# Patient Record
Sex: FEMALE | Race: WHITE | NOT HISPANIC OR LATINO
[De-identification: names, ages, dates, MRNs, and addresses within clinical notes are randomized per-mention and may not be internally consistent; named-entity substitution may affect disease eponyms.]

---

## 2018-12-15 ENCOUNTER — TRANSCRIPTION ENCOUNTER (OUTPATIENT)
Age: 48
End: 2018-12-15

## 2019-04-30 ENCOUNTER — TRANSCRIPTION ENCOUNTER (OUTPATIENT)
Age: 49
End: 2019-04-30

## 2019-08-20 ENCOUNTER — TRANSCRIPTION ENCOUNTER (OUTPATIENT)
Age: 49
End: 2019-08-20

## 2021-01-15 ENCOUNTER — APPOINTMENT (OUTPATIENT)
Dept: PULMONOLOGY | Facility: CLINIC | Age: 51
End: 2021-01-15
Payer: COMMERCIAL

## 2021-01-15 DIAGNOSIS — Z20.822 CONTACT WITH AND (SUSPECTED) EXPOSURE TO COVID-19: ICD-10-CM

## 2021-01-15 DIAGNOSIS — R05 COUGH: ICD-10-CM

## 2021-01-15 DIAGNOSIS — U07.1 COVID-19: ICD-10-CM

## 2021-01-15 DIAGNOSIS — R07.89 OTHER CHEST PAIN: ICD-10-CM

## 2021-01-15 DIAGNOSIS — J45.909 UNSPECIFIED ASTHMA, UNCOMPLICATED: ICD-10-CM

## 2021-01-15 PROBLEM — Z00.00 ENCOUNTER FOR PREVENTIVE HEALTH EXAMINATION: Status: ACTIVE | Noted: 2021-01-15

## 2021-01-15 PROCEDURE — 99203 OFFICE O/P NEW LOW 30 MIN: CPT | Mod: 95

## 2021-01-16 ENCOUNTER — LABORATORY RESULT (OUTPATIENT)
Age: 51
End: 2021-01-16

## 2021-01-19 ENCOUNTER — NON-APPOINTMENT (OUTPATIENT)
Age: 51
End: 2021-01-19

## 2023-06-06 ENCOUNTER — APPOINTMENT (OUTPATIENT)
Dept: ORTHOPEDIC SURGERY | Facility: CLINIC | Age: 53
End: 2023-06-06

## 2023-06-06 ENCOUNTER — APPOINTMENT (OUTPATIENT)
Dept: ORTHOPEDIC SURGERY | Facility: CLINIC | Age: 53
End: 2023-06-06
Payer: COMMERCIAL

## 2023-06-06 VITALS — HEIGHT: 65 IN | WEIGHT: 154 LBS | BODY MASS INDEX: 25.66 KG/M2

## 2023-06-06 DIAGNOSIS — M54.12 RADICULOPATHY, CERVICAL REGION: ICD-10-CM

## 2023-06-06 DIAGNOSIS — M75.21 BICIPITAL TENDINITIS, RIGHT SHOULDER: ICD-10-CM

## 2023-06-06 DIAGNOSIS — M25.511 PAIN IN RIGHT SHOULDER: ICD-10-CM

## 2023-06-06 PROCEDURE — 73030 X-RAY EXAM OF SHOULDER: CPT | Mod: RT

## 2023-06-06 PROCEDURE — 72040 X-RAY EXAM NECK SPINE 2-3 VW: CPT

## 2023-06-06 PROCEDURE — 99203 OFFICE O/P NEW LOW 30 MIN: CPT

## 2023-06-06 RX ORDER — PSYLLIUM HUSK 0.4 G
1000-800 CAPSULE ORAL
Refills: 0 | Status: COMPLETED | COMMUNITY
Start: 2021-01-15 | End: 2023-06-06

## 2023-06-06 RX ORDER — LEVALBUTEROL TARTRATE 45 UG/1
45 AEROSOL, METERED ORAL
Refills: 0 | Status: COMPLETED | COMMUNITY
End: 2023-06-06

## 2023-06-06 RX ORDER — DICLOFENAC SODIUM 75 MG/1
75 TABLET, DELAYED RELEASE ORAL
Qty: 60 | Refills: 2 | Status: ACTIVE | COMMUNITY
Start: 2023-06-06 | End: 1900-01-01

## 2023-06-06 RX ORDER — DEXAMETHASONE 6 MG/1
6 TABLET ORAL DAILY
Qty: 10 | Refills: 0 | Status: COMPLETED | COMMUNITY
Start: 2021-01-15 | End: 2023-06-06

## 2023-06-06 NOTE — REASON FOR VISIT
[FreeTextEntry2] : Patient is a 52 year old RHD female with complaints of R Shoulder pain x several weeks. Pain radiates down her Arm  to her hand Some intermittent tingling. Pain in bicep. No injury or trauma. Takes occasional advil. Able to sleep at night.

## 2023-06-06 NOTE — ASSESSMENT
[FreeTextEntry1] : patient is leaving to [place] in 3 days. Prescribed diclofenac. MRI cervical spine ordered. Will start PT when returns home.

## 2023-06-06 NOTE — HISTORY OF PRESENT ILLNESS
[Neck] : neck [Right Arm] : right arm [Gradual] : gradual [7] : 7 [4] : 4 [Burning] : burning [Dull/Aching] : dull/aching [Radiating] : radiating [Sharp] : sharp [Tingling] : tingling [Frequent] : frequent [Household chores] : household chores [Leisure] : leisure [Sleep] : sleep [Rest] : rest [Exercising] : exercising [Full time] : Work status: full time [] : Post Surgical Visit: no [FreeTextEntry7] : R Arm  [de-identified] : [facility]

## 2023-06-06 NOTE — PHYSICAL EXAM
[Straightening consistent with spasm] : Straightening consistent with spasm [Disc space narrowing] : Disc space narrowing [NL (0-180)] : full active abduction 0-180 degrees [NL (0-70)] : full internal rotation 0-70 degrees [NL (0-90)] : full external rotation 0-90 degrees [Sitting] : sitting [Mild] : mild [Right] : right shoulder [There are no fractures, subluxations or dislocations. No significant abnormalities are seen] : There are no fractures, subluxations or dislocations. No significant abnormalities are seen [] : no sensory deficits

## 2023-06-21 ENCOUNTER — FORM ENCOUNTER (OUTPATIENT)
Age: 53
End: 2023-06-21

## 2023-06-22 ENCOUNTER — APPOINTMENT (OUTPATIENT)
Dept: MRI IMAGING | Facility: CLINIC | Age: 53
End: 2023-06-22
Payer: COMMERCIAL

## 2023-06-22 PROCEDURE — 72141 MRI NECK SPINE W/O DYE: CPT

## 2023-06-27 ENCOUNTER — APPOINTMENT (OUTPATIENT)
Dept: ORTHOPEDIC SURGERY | Facility: CLINIC | Age: 53
End: 2023-06-27
Payer: COMMERCIAL

## 2023-06-27 VITALS — HEIGHT: 65 IN | WEIGHT: 154 LBS | BODY MASS INDEX: 25.66 KG/M2

## 2023-06-27 DIAGNOSIS — R93.7 ABNORMAL FINDINGS ON DIAGNOSTIC IMAGING OF OTHER PARTS OF MUSCULOSKELETAL SYSTEM: ICD-10-CM

## 2023-06-27 PROCEDURE — 99214 OFFICE O/P EST MOD 30 MIN: CPT

## 2023-06-27 NOTE — DATA REVIEWED
[MRI] : MRI [Cervical Spine] : cervical spine [I independently reviewed and interpreted images and report] : I independently reviewed and interpreted images and report [FreeTextEntry1] : MRI cervical spine done June 22, 2023 was reviewed.  There is multilevel degenerative disc disease.  Mild broad disc protrusion C4-5.  Mild central disc protrusion C5-6.\par Also reported as thyroid nodule suspected on the left measuring up to 3 cm incompletely evaluated on current exam

## 2023-06-27 NOTE — REASON FOR VISIT
[FreeTextEntry2] : Patient complains of continued R Shoulder pain since her last visit. Patient saw Dr. Jiang on June 6th and was sent for an MRI. Pain increases when lying in the bed. Continued radiating pain down the Arm. Some numbness and tingling in her R Arm and Hand. Patient would like to discuss MRI results./////

## 2023-06-27 NOTE — IMAGING
[Right] : right shoulder [FreeTextEntry1] : Reviewed and interpreted.  Right shoulder external rotation and outlet views-type I-II acromion

## 2023-06-27 NOTE — HISTORY OF PRESENT ILLNESS
[Gradual] : gradual [3] : 3 [Dull/Aching] : dull/aching [Sharp] : sharp [Intermittent] : intermittent [Lying in bed] : lying in bed [Full time] : Work status: full time [de-identified] : The patient is a 52-year-old right-hand-dominant female with pain in her neck and shoulder over the past several months.  No injury.  She has mild to moderate pain right shoulder reaching above her arm behind her.  Pain awakens her from sleep at night.\par She has mild pain and stiffness in her neck with movement.  Her neck feels sore.  She had seen Dr. Jiang.  She had MRI cervical spine.  She was given prescription for oral diclofenac which she has not taken [] : Post Surgical Visit: no [FreeTextEntry7] : R Arm [de-identified] : 6/6/23 [de-identified] : DR. Jiang [de-identified] : [facility]

## 2023-06-27 NOTE — DISCUSSION/SUMMARY
[Medication Risks Reviewed] : Medication risks reviewed [de-identified] : The patient will have MRI right shoulder\par She is referred for physical therapy program 2-3 times a week\par Moist heat to her neck, ice to her right shoulder prn\par She can try Voltaren gel as needed.  Dr. Jiang her given her prescription for diclofenac 75 mg.  She can take this twice daily with food prn but will not use Voltaren gel if she is taking this\par Tylenol as needed\par I offered her a cortisone injection right shoulder today.  Risk benefits and the alternatives were discussed.  She will think about this\par The patient is aware of thyroid nodules and is followed on a regular basis by endocrinologist Dr. Megan Elkins\par \par Impression:\par Cervical strain/spondylosis\par Right shoulder impingement.  Rule out rotator cuff tear

## 2023-07-20 ENCOUNTER — NON-APPOINTMENT (OUTPATIENT)
Age: 53
End: 2023-07-20

## 2023-08-07 ENCOUNTER — APPOINTMENT (OUTPATIENT)
Dept: ORTHOPEDIC SURGERY | Facility: CLINIC | Age: 53
End: 2023-08-07
Payer: COMMERCIAL

## 2023-08-07 DIAGNOSIS — S16.1XXD STRAIN OF MUSCLE, FASCIA AND TENDON AT NECK LEVEL, SUBSEQUENT ENCOUNTER: ICD-10-CM

## 2023-08-07 DIAGNOSIS — M25.811 OTHER SPECIFIED JOINT DISORDERS, RIGHT SHOULDER: ICD-10-CM

## 2023-08-07 DIAGNOSIS — M47.812 SPONDYLOSIS W/OUT MYELOPATHY OR RADICULOPATHY, CERVICAL REGION: ICD-10-CM

## 2023-08-07 DIAGNOSIS — R93.6 ABNORMAL FINDINGS ON DIAGNOSTIC IMAGING OF LIMBS: ICD-10-CM

## 2023-08-07 PROCEDURE — 99214 OFFICE O/P EST MOD 30 MIN: CPT

## 2023-08-07 NOTE — DISCUSSION/SUMMARY
[Medication Risks Reviewed] : Medication risks reviewed [de-identified] : MRI right shoulder was discussed with the patient She is referred for physical therapy to [facility] 2-3 times a week Moist heat to her neck, ice to her right shoulder prn Continue Advil as needed Tylenol as needed I offered her a cortisone injection right shoulder today.  Risk benefits and the alternatives were discussed.  She will think about this The patient is aware of thyroid nodules and is followed on a regular basis by endocrinologist Dr. Megan Elkins  Impression: Cervical strain/spondylosis Right shoulder impingement

## 2023-08-07 NOTE — IMAGING
[Right] : right shoulder [de-identified] : Cervical spine Inspection normal Good active range of motion Slight tenderness trapezius on the right Negative foraminal closing test bilaterally  Right shoulder No swelling Full active range of motion Mild tenderness lateral subacromial region Positive impingement 160 degrees Supraspinatus strength 4+/5 Radial pulse 2+   [FreeTextEntry1] : Reviewed and interpreted.  Right shoulder external rotation and outlet views-type I-II acromion

## 2023-08-07 NOTE — DATA REVIEWED
[MRI] : MRI [Right] : of the right [Shoulder] : shoulder [I independently reviewed and interpreted images and report] : I independently reviewed and interpreted images and report [FreeTextEntry1] : MRI right shoulder done at [facility] July 17, 2023 was reviewed. There are mild to moderate increased signal changes in the supraspinatus tendon. Small partial linear interstitial tear. Moderate degenerative changes of the AC joint. Fibrocystic change. Tuberosity.

## 2023-08-07 NOTE — HISTORY OF PRESENT ILLNESS
[Right Arm] : right arm [Gradual] : gradual [3] : 3 [Dull/Aching] : dull/aching [Intermittent] : intermittent [Leisure] : leisure [Sleep] : sleep [Rest] : rest [Exercising] : exercising [Full time] : Work status: full time [de-identified] : The patient has continued mild to moderate pain right shoulder reaching above her arm behind her.  Occasional awakening from sleep at night Mild occasional pain in her neck with movement.  No radicular complaints She had MRI right shoulder She has not started physical therapy [] : Post Surgical Visit: no [de-identified] : 6/27/2023 [de-identified] : Dr. Hall  [de-identified] : MRI  [de-identified] : [facility]

## 2023-09-07 ENCOUNTER — APPOINTMENT (OUTPATIENT)
Dept: ORTHOPEDIC SURGERY | Facility: CLINIC | Age: 53
End: 2023-09-07

## 2025-04-21 ENCOUNTER — APPOINTMENT (OUTPATIENT)
Dept: CT IMAGING | Facility: CLINIC | Age: 55
End: 2025-04-21
Payer: SELF-PAY

## 2025-04-21 PROCEDURE — 75571 CT HRT W/O DYE W/CA TEST: CPT
